# Patient Record
Sex: MALE | Race: WHITE | NOT HISPANIC OR LATINO | URBAN - METROPOLITAN AREA
[De-identification: names, ages, dates, MRNs, and addresses within clinical notes are randomized per-mention and may not be internally consistent; named-entity substitution may affect disease eponyms.]

---
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
## 2025-05-12 ENCOUNTER — OFFICE VISIT (OUTPATIENT)
Dept: BEHAVIORAL HEALTH | Facility: PSYCHIATRIC FACILITY | Age: 20
End: 2025-05-12
Payer: COMMERCIAL

## 2025-05-12 VITALS
HEART RATE: 82 BPM | OXYGEN SATURATION: 97 % | DIASTOLIC BLOOD PRESSURE: 91 MMHG | BODY MASS INDEX: 28.34 KG/M2 | SYSTOLIC BLOOD PRESSURE: 132 MMHG | HEIGHT: 71 IN | WEIGHT: 202.4 LBS

## 2025-05-12 DIAGNOSIS — F41.9 ANXIETY DISORDER, UNSPECIFIED TYPE: ICD-10-CM

## 2025-05-12 DIAGNOSIS — F32.A DEPRESSION, UNSPECIFIED DEPRESSION TYPE: ICD-10-CM
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
## 2025-05-16 PROBLEM — F32.A DEPRESSION: Status: ACTIVE | Noted: 2025-05-16

## 2025-05-16 PROBLEM — F41.9 ANXIETY DISORDER: Status: ACTIVE | Noted: 2025-05-16

## 2025-05-16 ASSESSMENT — ENCOUNTER SYMPTOMS
DEPRESSION: 1
NERVOUS/ANXIOUS: 1
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
## 2025-06-16 ENCOUNTER — APPOINTMENT (OUTPATIENT)
Dept: BEHAVIORAL HEALTH | Facility: PSYCHIATRIC FACILITY | Age: 20
End: 2025-06-16

## 2025-06-16 VITALS
WEIGHT: 201.2 LBS | OXYGEN SATURATION: 98 % | BODY MASS INDEX: 28.17 KG/M2 | DIASTOLIC BLOOD PRESSURE: 81 MMHG | HEART RATE: 85 BPM | SYSTOLIC BLOOD PRESSURE: 136 MMHG | HEIGHT: 71 IN

## 2025-06-16 DIAGNOSIS — F32.89 OTHER DEPRESSION: ICD-10-CM

## 2025-06-16 DIAGNOSIS — F41.8 OTHER SPECIFIED ANXIETY DISORDERS: Primary | ICD-10-CM

## 2025-06-16 PROCEDURE — 99999 PR NO CHARGE: CPT

## 2025-06-16 ASSESSMENT — ENCOUNTER SYMPTOMS
NERVOUS/ANXIOUS: 1
INSOMNIA: 1
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
## 2025-07-14 DIAGNOSIS — F32.A DEPRESSION, UNSPECIFIED DEPRESSION TYPE: ICD-10-CM

## 2025-07-14 DIAGNOSIS — F41.9 ANXIETY DISORDER, UNSPECIFIED TYPE: ICD-10-CM

## 2025-07-17 NOTE — TELEPHONE ENCOUNTER
New prescription requested for Zoloft now 50mg daily. Will send to [facility] on [address] per last script's pharmacy.

## 2025-08-18 ENCOUNTER — OFFICE VISIT (OUTPATIENT)
Dept: BEHAVIORAL HEALTH | Facility: PSYCHIATRIC FACILITY | Age: 20
End: 2025-08-18

## 2025-08-18 VITALS
WEIGHT: 201.4 LBS | HEIGHT: 71 IN | BODY MASS INDEX: 28.19 KG/M2 | HEART RATE: 60 BPM | SYSTOLIC BLOOD PRESSURE: 112 MMHG | DIASTOLIC BLOOD PRESSURE: 55 MMHG | OXYGEN SATURATION: 96 %

## 2025-08-18 DIAGNOSIS — F41.1 GENERALIZED ANXIETY DISORDER: Primary | Chronic | ICD-10-CM

## 2025-08-18 PROCEDURE — 99999 PR NO CHARGE: CPT

## 2025-08-18 ASSESSMENT — ENCOUNTER SYMPTOMS
DEPRESSION: 0
NERVOUS/ANXIOUS: 0
INSOMNIA: 0